# Patient Record
Sex: FEMALE | Race: WHITE | NOT HISPANIC OR LATINO | Employment: OTHER | URBAN - METROPOLITAN AREA
[De-identification: names, ages, dates, MRNs, and addresses within clinical notes are randomized per-mention and may not be internally consistent; named-entity substitution may affect disease eponyms.]

---

## 2021-11-03 ENCOUNTER — APPOINTMENT (OUTPATIENT)
Dept: CT IMAGING | Facility: CLINIC | Age: 56
DRG: 299 | End: 2021-11-03
Attending: EMERGENCY MEDICINE
Payer: COMMERCIAL

## 2021-11-03 ENCOUNTER — APPOINTMENT (OUTPATIENT)
Dept: ULTRASOUND IMAGING | Facility: CLINIC | Age: 56
DRG: 299 | End: 2021-11-03
Attending: EMERGENCY MEDICINE
Payer: COMMERCIAL

## 2021-11-03 ENCOUNTER — HOSPITAL ENCOUNTER (INPATIENT)
Facility: CLINIC | Age: 56
LOS: 2 days | Discharge: HOME | DRG: 299 | End: 2021-11-05
Attending: EMERGENCY MEDICINE | Admitting: INTERNAL MEDICINE
Payer: COMMERCIAL

## 2021-11-03 DIAGNOSIS — I26.09 OTHER ACUTE PULMONARY EMBOLISM WITH ACUTE COR PULMONALE (H): ICD-10-CM

## 2021-11-03 DIAGNOSIS — R91.8 PULMONARY NODULES: ICD-10-CM

## 2021-11-03 DIAGNOSIS — Z20.822 SUSPECTED 2019 NOVEL CORONAVIRUS INFECTION: ICD-10-CM

## 2021-11-03 DIAGNOSIS — M79.662 PAIN OF LEFT CALF: ICD-10-CM

## 2021-11-03 DIAGNOSIS — I82.4Y2 ACUTE DEEP VEIN THROMBOSIS (DVT) OF PROXIMAL VEIN OF LEFT LOWER EXTREMITY (H): ICD-10-CM

## 2021-11-03 DIAGNOSIS — R07.9 ACUTE CHEST PAIN: ICD-10-CM

## 2021-11-03 LAB
ANION GAP SERPL CALCULATED.3IONS-SCNC: 9 MMOL/L (ref 3–14)
BASOPHILS # BLD AUTO: 0 10E3/UL (ref 0–0.2)
BASOPHILS NFR BLD AUTO: 0 %
BUN SERPL-MCNC: 14 MG/DL (ref 7–30)
CALCIUM SERPL-MCNC: 8.2 MG/DL (ref 8.5–10.1)
CHLORIDE BLD-SCNC: 104 MMOL/L (ref 94–109)
CO2 SERPL-SCNC: 24 MMOL/L (ref 20–32)
CREAT SERPL-MCNC: 1 MG/DL (ref 0.52–1.04)
D DIMER PPP FEU-MCNC: >20 UG/ML FEU (ref 0–0.5)
EOSINOPHIL # BLD AUTO: 0.1 10E3/UL (ref 0–0.7)
EOSINOPHIL NFR BLD AUTO: 1 %
ERYTHROCYTE [DISTWIDTH] IN BLOOD BY AUTOMATED COUNT: 11.9 % (ref 10–15)
GFR SERPL CREATININE-BSD FRML MDRD: 64 ML/MIN/1.73M2
GLUCOSE BLD-MCNC: 111 MG/DL (ref 70–99)
HCT VFR BLD AUTO: 35.9 % (ref 35–47)
HGB BLD-MCNC: 12.5 G/DL (ref 11.7–15.7)
IMM GRANULOCYTES # BLD: 0 10E3/UL
IMM GRANULOCYTES NFR BLD: 0 %
LYMPHOCYTES # BLD AUTO: 1 10E3/UL (ref 0.8–5.3)
LYMPHOCYTES NFR BLD AUTO: 13 %
MCH RBC QN AUTO: 32.1 PG (ref 26.5–33)
MCHC RBC AUTO-ENTMCNC: 34.8 G/DL (ref 31.5–36.5)
MCV RBC AUTO: 92 FL (ref 78–100)
MONOCYTES # BLD AUTO: 0.6 10E3/UL (ref 0–1.3)
MONOCYTES NFR BLD AUTO: 8 %
NEUTROPHILS # BLD AUTO: 5.7 10E3/UL (ref 1.6–8.3)
NEUTROPHILS NFR BLD AUTO: 78 %
NRBC # BLD AUTO: 0 10E3/UL
NRBC BLD AUTO-RTO: 0 /100
NT-PROBNP SERPL-MCNC: 162 PG/ML (ref 0–900)
PLATELET # BLD AUTO: 141 10E3/UL (ref 150–450)
POTASSIUM BLD-SCNC: 3.7 MMOL/L (ref 3.4–5.3)
RBC # BLD AUTO: 3.9 10E6/UL (ref 3.8–5.2)
SODIUM SERPL-SCNC: 137 MMOL/L (ref 133–144)
TROPONIN I SERPL-MCNC: <0.015 UG/L (ref 0–0.04)
WBC # BLD AUTO: 7.3 10E3/UL (ref 4–11)

## 2021-11-03 PROCEDURE — 250N000009 HC RX 250: Performed by: EMERGENCY MEDICINE

## 2021-11-03 PROCEDURE — 36415 COLL VENOUS BLD VENIPUNCTURE: CPT | Performed by: EMERGENCY MEDICINE

## 2021-11-03 PROCEDURE — 99285 EMERGENCY DEPT VISIT HI MDM: CPT | Mod: 25

## 2021-11-03 PROCEDURE — 83880 ASSAY OF NATRIURETIC PEPTIDE: CPT | Performed by: EMERGENCY MEDICINE

## 2021-11-03 PROCEDURE — 250N000011 HC RX IP 250 OP 636: Performed by: EMERGENCY MEDICINE

## 2021-11-03 PROCEDURE — 85379 FIBRIN DEGRADATION QUANT: CPT | Performed by: EMERGENCY MEDICINE

## 2021-11-03 PROCEDURE — 93971 EXTREMITY STUDY: CPT | Mod: LT

## 2021-11-03 PROCEDURE — 71275 CT ANGIOGRAPHY CHEST: CPT

## 2021-11-03 PROCEDURE — 85004 AUTOMATED DIFF WBC COUNT: CPT | Performed by: EMERGENCY MEDICINE

## 2021-11-03 PROCEDURE — 80048 BASIC METABOLIC PNL TOTAL CA: CPT | Performed by: EMERGENCY MEDICINE

## 2021-11-03 PROCEDURE — 84484 ASSAY OF TROPONIN QUANT: CPT | Performed by: EMERGENCY MEDICINE

## 2021-11-03 PROCEDURE — 120N000001 HC R&B MED SURG/OB

## 2021-11-03 PROCEDURE — 93005 ELECTROCARDIOGRAM TRACING: CPT

## 2021-11-03 RX ORDER — HEPARIN SODIUM 10000 [USP'U]/100ML
0-5000 INJECTION, SOLUTION INTRAVENOUS CONTINUOUS
Status: DISCONTINUED | OUTPATIENT
Start: 2021-11-03 | End: 2021-11-04

## 2021-11-03 RX ORDER — IOPAMIDOL 755 MG/ML
500 INJECTION, SOLUTION INTRAVASCULAR ONCE
Status: COMPLETED | OUTPATIENT
Start: 2021-11-03 | End: 2021-11-03

## 2021-11-03 RX ADMIN — HEPARIN SODIUM 1800 UNITS/HR: 10000 INJECTION, SOLUTION INTRAVENOUS at 23:21

## 2021-11-03 RX ADMIN — IOPAMIDOL 90 ML: 755 INJECTION, SOLUTION INTRAVENOUS at 21:44

## 2021-11-03 RX ADMIN — SODIUM CHLORIDE 100 ML: 9 INJECTION, SOLUTION INTRAVENOUS at 21:51

## 2021-11-03 ASSESSMENT — ENCOUNTER SYMPTOMS: SHORTNESS OF BREATH: 1

## 2021-11-03 NOTE — ED NOTES
Bed: HW03  Expected date: 11/3/21  Expected time: 6:38 PM  Means of arrival: Ambulance  Comments:  BV3-calf pain

## 2021-11-03 NOTE — ED TRIAGE NOTES
Pt arrived by EMS for concern of PE/ DVT; pt states she is having heaviness in her chest today; pt has swelling and tenderness to left calf; pt advised to come to ED by pcp

## 2021-11-04 ENCOUNTER — APPOINTMENT (OUTPATIENT)
Dept: CARDIOLOGY | Facility: CLINIC | Age: 56
DRG: 299 | End: 2021-11-04
Attending: INTERNAL MEDICINE
Payer: COMMERCIAL

## 2021-11-04 LAB
ANION GAP SERPL CALCULATED.3IONS-SCNC: 6 MMOL/L (ref 3–14)
ATRIAL RATE - MUSE: 69 BPM
BUN SERPL-MCNC: 14 MG/DL (ref 7–30)
CALCIUM SERPL-MCNC: 8.1 MG/DL (ref 8.5–10.1)
CHLORIDE BLD-SCNC: 107 MMOL/L (ref 94–109)
CO2 SERPL-SCNC: 26 MMOL/L (ref 20–32)
CREAT SERPL-MCNC: 0.8 MG/DL (ref 0.52–1.04)
DIASTOLIC BLOOD PRESSURE - MUSE: NORMAL MMHG
ERYTHROCYTE [DISTWIDTH] IN BLOOD BY AUTOMATED COUNT: 11.9 % (ref 10–15)
ERYTHROCYTE [DISTWIDTH] IN BLOOD BY AUTOMATED COUNT: 12.1 % (ref 10–15)
GFR SERPL CREATININE-BSD FRML MDRD: 84 ML/MIN/1.73M2
GLUCOSE BLD-MCNC: 101 MG/DL (ref 70–99)
HCT VFR BLD AUTO: 36.4 % (ref 35–47)
HCT VFR BLD AUTO: 37.1 % (ref 35–47)
HGB BLD-MCNC: 12.5 G/DL (ref 11.7–15.7)
HGB BLD-MCNC: 12.5 G/DL (ref 11.7–15.7)
INTERPRETATION ECG - MUSE: NORMAL
LVEF ECHO: NORMAL
MCH RBC QN AUTO: 31.3 PG (ref 26.5–33)
MCH RBC QN AUTO: 31.6 PG (ref 26.5–33)
MCHC RBC AUTO-ENTMCNC: 33.7 G/DL (ref 31.5–36.5)
MCHC RBC AUTO-ENTMCNC: 34.3 G/DL (ref 31.5–36.5)
MCV RBC AUTO: 92 FL (ref 78–100)
MCV RBC AUTO: 93 FL (ref 78–100)
P AXIS - MUSE: 55 DEGREES
PLATELET # BLD AUTO: 147 10E3/UL (ref 150–450)
PLATELET # BLD AUTO: 148 10E3/UL (ref 150–450)
POTASSIUM BLD-SCNC: 3.9 MMOL/L (ref 3.4–5.3)
PR INTERVAL - MUSE: 190 MS
QRS DURATION - MUSE: 92 MS
QT - MUSE: 414 MS
QTC - MUSE: 443 MS
R AXIS - MUSE: 12 DEGREES
RAD FLAG-ADDENDUM: ABNORMAL
RBC # BLD AUTO: 3.95 10E6/UL (ref 3.8–5.2)
RBC # BLD AUTO: 4 10E6/UL (ref 3.8–5.2)
SODIUM SERPL-SCNC: 139 MMOL/L (ref 133–144)
SYSTOLIC BLOOD PRESSURE - MUSE: NORMAL MMHG
T AXIS - MUSE: 52 DEGREES
TROPONIN I SERPL-MCNC: <0.015 UG/L (ref 0–0.04)
UFH PPP CHRO-ACNC: 0.35 IU/ML
UFH PPP CHRO-ACNC: 0.41 IU/ML
UFH PPP CHRO-ACNC: 0.62 IU/ML
VENTRICULAR RATE- MUSE: 69 BPM
WBC # BLD AUTO: 6.8 10E3/UL (ref 4–11)
WBC # BLD AUTO: 8.6 10E3/UL (ref 4–11)

## 2021-11-04 PROCEDURE — 85520 HEPARIN ASSAY: CPT | Performed by: INTERNAL MEDICINE

## 2021-11-04 PROCEDURE — 36415 COLL VENOUS BLD VENIPUNCTURE: CPT | Performed by: INTERNAL MEDICINE

## 2021-11-04 PROCEDURE — 84484 ASSAY OF TROPONIN QUANT: CPT | Performed by: INTERNAL MEDICINE

## 2021-11-04 PROCEDURE — 120N000001 HC R&B MED SURG/OB

## 2021-11-04 PROCEDURE — 99223 1ST HOSP IP/OBS HIGH 75: CPT | Mod: AI | Performed by: INTERNAL MEDICINE

## 2021-11-04 PROCEDURE — 80048 BASIC METABOLIC PNL TOTAL CA: CPT | Performed by: INTERNAL MEDICINE

## 2021-11-04 PROCEDURE — 250N000011 HC RX IP 250 OP 636: Performed by: INTERNAL MEDICINE

## 2021-11-04 PROCEDURE — 85027 COMPLETE CBC AUTOMATED: CPT | Performed by: INTERNAL MEDICINE

## 2021-11-04 PROCEDURE — 93306 TTE W/DOPPLER COMPLETE: CPT | Mod: 26 | Performed by: INTERNAL MEDICINE

## 2021-11-04 PROCEDURE — 93306 TTE W/DOPPLER COMPLETE: CPT

## 2021-11-04 PROCEDURE — 258N000003 HC RX IP 258 OP 636: Performed by: INTERNAL MEDICINE

## 2021-11-04 RX ORDER — ACETAMINOPHEN 325 MG/1
650 TABLET ORAL EVERY 6 HOURS PRN
Status: DISCONTINUED | OUTPATIENT
Start: 2021-11-04 | End: 2021-11-05 | Stop reason: HOSPADM

## 2021-11-04 RX ORDER — ACETAMINOPHEN 650 MG/1
650 SUPPOSITORY RECTAL EVERY 6 HOURS PRN
Status: DISCONTINUED | OUTPATIENT
Start: 2021-11-04 | End: 2021-11-05 | Stop reason: HOSPADM

## 2021-11-04 RX ORDER — LIDOCAINE 40 MG/G
CREAM TOPICAL
Status: DISCONTINUED | OUTPATIENT
Start: 2021-11-04 | End: 2021-11-05 | Stop reason: HOSPADM

## 2021-11-04 RX ORDER — ONDANSETRON 4 MG/1
4 TABLET, ORALLY DISINTEGRATING ORAL EVERY 6 HOURS PRN
Status: DISCONTINUED | OUTPATIENT
Start: 2021-11-04 | End: 2021-11-05 | Stop reason: HOSPADM

## 2021-11-04 RX ORDER — ONDANSETRON 2 MG/ML
4 INJECTION INTRAMUSCULAR; INTRAVENOUS EVERY 6 HOURS PRN
Status: DISCONTINUED | OUTPATIENT
Start: 2021-11-04 | End: 2021-11-05 | Stop reason: HOSPADM

## 2021-11-04 RX ORDER — ESTRADIOL VALERATE 20 MG/ML
0.2 INJECTION INTRAMUSCULAR WEEKLY
Status: ON HOLD | COMMUNITY
Start: 2021-03-10 | End: 2021-11-05

## 2021-11-04 RX ORDER — AMOXICILLIN 250 MG
1 CAPSULE ORAL 2 TIMES DAILY PRN
Status: DISCONTINUED | OUTPATIENT
Start: 2021-11-04 | End: 2021-11-05 | Stop reason: HOSPADM

## 2021-11-04 RX ORDER — AMOXICILLIN 250 MG
2 CAPSULE ORAL 2 TIMES DAILY PRN
Status: DISCONTINUED | OUTPATIENT
Start: 2021-11-04 | End: 2021-11-05 | Stop reason: HOSPADM

## 2021-11-04 RX ORDER — SODIUM CHLORIDE 9 MG/ML
INJECTION, SOLUTION INTRAVENOUS CONTINUOUS
Status: ACTIVE | OUTPATIENT
Start: 2021-11-04 | End: 2021-11-04

## 2021-11-04 RX ORDER — HEPARIN SODIUM 10000 [USP'U]/100ML
0-5000 INJECTION, SOLUTION INTRAVENOUS CONTINUOUS
Status: DISCONTINUED | OUTPATIENT
Start: 2021-11-04 | End: 2021-11-05

## 2021-11-04 RX ADMIN — SODIUM CHLORIDE: 9 INJECTION, SOLUTION INTRAVENOUS at 02:53

## 2021-11-04 RX ADMIN — HEPARIN SODIUM 1800 UNITS/HR: 10000 INJECTION, SOLUTION INTRAVENOUS at 01:48

## 2021-11-04 RX ADMIN — HEPARIN SODIUM 1800 UNITS/HR: 10000 INJECTION, SOLUTION INTRAVENOUS at 08:56

## 2021-11-04 RX ADMIN — SODIUM CHLORIDE: 9 INJECTION, SOLUTION INTRAVENOUS at 07:47

## 2021-11-04 RX ADMIN — HEPARIN SODIUM 1800 UNITS/HR: 10000 INJECTION, SOLUTION INTRAVENOUS at 23:00

## 2021-11-04 ASSESSMENT — ACTIVITIES OF DAILY LIVING (ADL)
ADLS_ACUITY_SCORE: 3
ADLS_ACUITY_SCORE: 12
ADLS_ACUITY_SCORE: 3
ADLS_ACUITY_SCORE: 12
ADLS_ACUITY_SCORE: 3
ADLS_ACUITY_SCORE: 12
ADLS_ACUITY_SCORE: 3

## 2021-11-04 NOTE — CONSULTS
Anticoagulation coverage check.  Patient has [employer] [facility].    Xarelto/Eliquis:  $3/mo.     Enoxaparin 100mg x 10 syringes: $1.    Sedricktoven (warfarin): $1/mo.      TRENT Ruiz, Pharmacy Technician/Liaison, Discharge Pharmacy, [phone]

## 2021-11-04 NOTE — PLAN OF CARE
girma PO well, up indep in room,denies pain, voiding in good amts, Heparin IV at 1800 units per hour    1900 pt called nurse into room stating felt dizzy, flushed and like she was having a mild anxiety attack. She stated she had 2 episodes like this yesterday. Vitals were taken and were WDL. Report given to evening nurse who will continue to monitor

## 2021-11-04 NOTE — ED PROVIDER NOTES
History   Chief Complaint:  Chest Pain and calf pain       The history is provided by the patient.      Jerri Peraza is a 56-year-old female presenting to the ED from urgent care for evaluation of left calf pain and chest pain.  Patient reports approximately 2 weeks ago, she felt as though she was coming down with a sinus infection given associated congestion.  She thereafter had decreased sense of smell.  This was reminiscent of her diagnosis of Covid-19 last year.  She has since been vaccinated earlier this year.  Beginning on Sunday, she notes the feeling of congestion seemed to descend down into her chest, with associated heaviness.  She notes the heaviness initially in the morning, though this improves as the day goes on.  She denies saúl pain.  She does report more shortness of breath, particularly with exertion.  Over the past 2 weeks, she also has noted a pain to her left calf.  She was seen at urgent care and given concern for DVT/PE was referred to the ER for further evaluation.  She reports a prior history of superficial thrombophlebitis, though no prior diagnosis of DVT nor PE.  She denies any history of known coronary artery disease, nor diagnosis of hypertension, hyperlipidemia nor diabetes.  She is a non-smoker.  There is a family history of peripheral arterial disease in father, though no other genetic prothrombotic disorders patient is aware of.       Review of Systems   HENT: Positive for congestion.    Respiratory: Positive for shortness of breath.    Cardiovascular: Positive for chest pain.        Calf pain    All other systems reviewed and are negative.      Allergies:  The patient has no known allergies.     Medications:  No current outpatient medications on file.    Past Medical History:    No prior PE or DVT  COVID-19    Social History:  Patient was accompanied to the ED.  No tobacco use    Physical Exam     Patient Vitals for the past 24 hrs:   BP Temp Temp src Pulse Resp SpO2 Weight    11/03/21 2000 99/87 -- -- 65 -- 96 % --   11/03/21 1852 (!) 144/96 98.5  F (36.9  C) Oral 73 13 95 % 99.8 kg (220 lb)       Physical Exam  General:              Well-nourished              Speaking in full sentences  Eyes:              Conjunctiva without injection or scleral icterus  ENT:              Moist mucous membranes              Nares patent              Pinnae normal  Neck:              Full ROM              No stiffness appreciated  Resp:              Lungs CTAB              No crackles, wheezing or audible rubs              Good air movement  CV:                    Normal rate, regular rhythm              S1 and S2 present              No murmur, gallop or rub  GI:              BS present              Abdomen soft without distention              Non-tender to light and deep palpation              No guarding or rebound tenderness  Skin:              Warm, dry, well perfused              No rashes or open wounds on exposed skin  MSK:              Moves all extremities              No focal deformities or swelling  Neuro:              Alert              Answers questions appropriately              Moves all extremities equally              Gait stable  Psych:              Normal affect, normal mood      Emergency Department Course     ECG:  ECG taken at 1855, ECG read at 1859  Normal sinus rhythm. Normal ECG.    Rate 69 bpm. ND interval 190 ms. QRS duration 92 ms. QT/QTc 414/443 ms. P-R-T axes 55 12 52.     Imaging:  CT Chest Pulmonary Embolism w Contrast  1.  Examination positive for bilateral pulmonary emboli with proximal extent noted in the right upper and lower lobar pulmonary arteries. There is CT evidence of mild right heart strain.  2.  A few nodular peripheral airspace opacities measuring up to 9 mm in the right lower lobe may be inflammatory, though recommend short-term follow-up chest CT to ensure resolution.  3.  Findings discussed by telephone with Dr. Longoria at 2206 on 11/03/2021.  Reading  per radiology.     US Lower Extremity Venous Duplex Left  Nonocclusive deep vein thrombosis in the distal femoral vein and posterior tibial and peroneal veins of the calf.  Reading per radiology.     Laboratory:  CBC: WBC: 7.3, HGB: 12.5, PLT: 141 (L)     BMP: Glucose 111 H , Calcium: 8.2 (L) , o/w WNL (Creatinine: 1.0)    Troponin (Collected 1918): <0.015    D-dimer: >20.0 (HH)    BNP: 162    Emergency Department Course:    Reviewed:  I reviewed nursing notes, vitals and past medical history    Assessments:  1901 I obtained history and examined the patient as noted above.   2115 I rechecked the patient and explained her blood work. We had a long conversation with the risks and benefits of having CT imaging vs. no CT imaging.     Consults:   2208 I consulted with Dr. Vergara, radiology, regarding the patient's CT chest imaging.   2319 I consulted with Dr. Villalobos, [facility], regarding the patient's history and presentation in the ED.     Disposition:  The patient was admitted to the hospital under the care of Dr. Villalobos.       Impression & Plan     CMS Diagnoses: None    Medical Decision Making:  Jerri Peraza is a 56-year-old female presenting to the emergency department for evaluation of left calf pain and chest pain.  VS on presentation reveal elevated BP though otherwise are unremarkable.  History, exam, and ED course as outlined above. Initial work-up included D-dimer which returned markedly elevated greater than 20.  Venous ultrasound confirms a nonocclusive DVT in the distal femoral vein, posterior tibial and peroneal veins of the calf.  Given associated chest discomfort, high suspicion for associated pulmonary embolism.  We discussed patient's current hemodynamic stability, negative troponin, and unremarkable EKG.  Using shared decision-making, we discussed risks/benefits of further evaluation with CT.  Patient is electing to proceed which I do feel to be reasonable.  CT chest does demonstrate  positive bilateral pulmonary emboli noted to the right upper and right lower lobe pulmonary arteries as well as segmental and subsegmental pulmonary arteries involving all lobes of both lungs.  Fortunately no evidence of saddle or central pulmonary embolism or aortic dissection.  Patient is noted to have evidence of RV strain however.  Troponin and BNP are within normal limits.  Patient was informed of the incidentally noted pulmonary nodules, which will require further evaluation as an outpatient.  In the absence of contraindications, patient was started on heparin here in the ER will plan for hospital admission given suggestion of right heart strain on CT.  Questions answered prior to admission.       Diagnosis:    ICD-10-CM    1. Acute chest pain  R07.9    2. Pain of left calf  M79.662    3. Suspected 2019 novel coronavirus infection  Z20.822    4. Other acute pulmonary embolism with acute cor pulmonale (H)  I26.09    5. Acute deep vein thrombosis (DVT) of proximal vein of left lower extremity (H)  I82.4Y2    6. Pulmonary nodules  R91.8        Scribe Disclosure:  I, Susan Smalls, am serving as a scribe at 7:03 PM on 11/3/2021 to document services personally performed by Avery Longoria MD based on my observations and the provider's statements to me.          Avery Longoria MD  11/03/21 0164

## 2021-11-04 NOTE — ED NOTES
[facility]  ED Nurse Handoff Report    Jerri Peraza is a 56 year old female   ED Chief complaint: Chest Pain and calf pain  . ED Diagnosis:   Final diagnoses:   Acute chest pain   Pain of left calf   Suspected 2019 novel coronavirus infection   Other acute pulmonary embolism with acute cor pulmonale (H)   Acute deep vein thrombosis (DVT) of proximal vein of left lower extremity (H)   Pulmonary nodules     Allergies: No Known Allergies    Code Status: Full Code  Activity level - Baseline/Home:  Independent. Activity Level - Current:   Stand by Assist. Lift room needed: No. Bariatric: No   Needed: No   Isolation: No. Infection: Not Applicable.     Vital Signs:   Vitals:    11/03/21 1852 11/03/21 2000   BP: (!) 144/96 99/87   Pulse: 73 65   Resp: 13    Temp: 98.5  F (36.9  C)    TempSrc: Oral    SpO2: 95% 96%   Weight: 99.8 kg (220 lb)        Cardiac Rhythm:  ,      Pain level:    Patient confused: No. Patient Falls Risk: Yes.   Elimination Status: Has voided   Patient Report - Initial Complaint: Chest pain. Focused Assessment: t arrived by EMS for concern of PE/ DVT; pt states she is having heaviness in her chest today; pt has swelling and tenderness to left calf; pt advised to come to ED by pcp   Tests Performed: labs, imaging. Abnormal Results:   Labs Ordered and Resulted from Time of ED Arrival to Time of ED Departure   D DIMER QUANTITATIVE - Abnormal       Result Value    D-Dimer Quantitative >20.00 (*)    BASIC METABOLIC PANEL - Abnormal    Sodium 137      Potassium 3.7      Chloride 104      Carbon Dioxide (CO2) 24      Anion Gap 9      Urea Nitrogen 14      Creatinine 1.00      Calcium 8.2 (*)     Glucose 111 (*)     GFR Estimate 64     CBC WITH PLATELETS AND DIFFERENTIAL - Abnormal    WBC Count 7.3      RBC Count 3.90      Hemoglobin 12.5      Hematocrit 35.9      MCV 92      MCH 32.1      MCHC 34.8      RDW 11.9      Platelet Count 141 (*)     % Neutrophils 78      %  Lymphocytes 13      % Monocytes 8      % Eosinophils 1      % Basophils 0      % Immature Granulocytes 0      NRBCs per 100 WBC 0      Absolute Neutrophils 5.7      Absolute Lymphocytes 1.0      Absolute Monocytes 0.6      Absolute Eosinophils 0.1      Absolute Basophils 0.0      Absolute Immature Granulocytes 0.0      Absolute NRBCs 0.0     TROPONIN I - Normal    Troponin I <0.015     NT PROBNP INPATIENT - Normal    N terminal Pro BNP Inpatient 162       CT Chest Pulmonary Embolism w Contrast   Final Result   IMPRESSION:   1.  Examination positive for bilateral pulmonary emboli with proximal extent noted in the right upper and lower lobar pulmonary arteries. There is CT evidence of mild right heart strain.      2.  A few nodular peripheral airspace opacities measuring up to 9 mm in the right lower lobe may be inflammatory, though recommend short-term follow-up chest CT to ensure resolution.      3.  Findings discussed by telephone with Dr. Longoria at 2208 on 11/03/2021.      US Lower Extremity Venous Duplex Left   Final Result   IMPRESSION:   1.  Nonocclusive deep vein thrombosis in the distal femoral vein and posterior tibial and peroneal veins of the calf.        .   Treatments provided: heparin drip  Family Comments: friend present  OBS brochure/video discussed/provided to patient:  No  ED Medications:   Medications   heparin 25,000 units in 0.45% NaCl 250 mL ANTICOAGULANT infusion (1,800 Units/hr Intravenous New Bag 11/3/21 2321)   Saline CT scan flush (100 mLs Intravenous Given 11/3/21 2151)   iopamidol (ISOVUE-370) solution 500 mL (90 mLs Intravenous Given 11/3/21 2144)   heparin ANTICOAGULANT Loading dose for HIGH INTENSITY TREATMENT * Give BEFORE starting heparin infusion (8,000 Units Intravenous Given 11/3/21 2320)     Drips infusing:  No  For the majority of the shift, the patient's behavior Green. Interventions performed were N/A.    Sepsis treatment initiated: No     Patient tested for COVID 19 prior to  admission: YES    ED Nurse Name/Phone Number: Merly Payne RN,   11:51 PM    RECEIVING UNIT ED HANDOFF REVIEW    Above ED Nurse Handoff Report was reviewed: Yes  Reviewed by: Cele Mccarthy, RUBI on November 4, 2021 at 11:40 AM   Did you vocera the ED RN: yes, Mey Marlow RN

## 2021-11-04 NOTE — H&P
H&P dictated    Admit for PE    Incidental finding of 9 mm nodule on chest CT with recommendation for short term follow up.  I did NOT discuss this with the patient and should be discussed with her prior to discharge.

## 2021-11-04 NOTE — PROGRESS NOTES
I agree with the note and plan per Dr Villalobos. Admitted with acute PE and concomitant LLE DVT. Continue IV heparin and switch to DOAC in am.

## 2021-11-04 NOTE — H&P
Admitted: 11/03/2021    CHIEF COMPLAINT:  Left calf pain.  The patient found to have DVT and PE.    HISTORY OF PRESENT ILLNESS:  Ms. Peraza is a 56-year-old female with no significant medical history who presented to the hospital for the above concerns.  The patient reports that she began to notice symptoms approximately 2 weeks ago.  At that point, she felt like she was coming down with a sinus infection.  She also thought that perhaps she had COVID again.  She had COVID previously.  She noticed a change in her smell that felt similar to previous COVID symptoms.  She also noticed some chest congestion and associated shortness of breath symptoms.  Over the past 2 weeks, she also noticed pain in her left calf.  She does have a prior history of superficial thrombophlebitis but no history of deep venous thrombosis or pulmonary embolism.  She went to urgent care and was referred to the ER for evaluation.    On arrival to the ER, vital signs included blood pressure 145/95 with a heart rate of 75.  No fever.  Saturation 95% on room air.  Workup in the ER included labs and imaging.  CBC normal.  BMP normal.  Troponin undetectable.  D-dimer greater than 20.  BNP was 160.  Further workup included a chest CT scan which was positive for bilateral pulmonary emboli with proximal extent noted in the right upper and lower lobar pulmonary arteries.  There was CT evidence of mild right heart strain.  Left lower extremity duplex ultrasound also showed nonocclusive DVT in the distal femoral vein and posterior tibial and peroneal veins of the calf.  Incidental finding of a 9 mm right lower lobe nodule with recommendation for short-term followup CT as well.    I spoke with Dr. Longoria of the ER.  The patient is being admitted on a heparin drip to the Hospitalist Service.    PAST MEDICAL HISTORY:    1.  History of COVID-19 infection.  She has since been vaccinated.  2.  History of superficial thrombophlebitis but no history of deep  venous thrombosis or PE previously.    CURRENT MEDICATIONS:  None.    FAMILY HISTORY:  Reviewed.  No known history of DVT or PE.    ALLERGIES:  NONE.    SOCIAL HISTORY:  Denies smoking.  Social alcohol use.    REVIEW OF SYSTEMS:  See HPI for details.  Comprehensive greater than 10-point review of systems is otherwise negative besides that detailed above.    PHYSICAL EXAMINATION:    VITAL SIGNS:  Blood pressure is 150/95 with a heart rate of 65.  No fever.  Saturation 97% on room air.  GENERAL:  The patient appears nontoxic and in no acute distress.  She appears awake, alert, oriented x 3.  She is a good historian.  HEENT:  Head is atraumatic.  Sclerae white.  Eyelids normal.  Conjunctivae normal.  Extraocular movements are intact.  NECK:  Supple.  No cervical or supraclavicular lymphadenopathy.  CARDIOVASCULAR:  Heart is regular rate and rhythm.  No significant murmurs.  No lower extremity edema.  LUNGS:  Clear to auscultation bilaterally.  No intercostal retractions.  No conversational dyspnea.  ABDOMEN:  Nontender, soft, no masses, no organomegaly.  EXTREMITIES:  Show no edema.  SKIN:  Reveals no rashes.  No jaundice.  Skin is dry to touch.  NEUROLOGIC:  Cranial nerves II-XII are intact.  Moves all extremities appropriately.  Sensation intact to light touch in the upper and lower extremities bilaterally.  PSYCHIATRIC:  The patient is awake, alert and oriented x 3.  Mood and affect are normal and appropriate.    ASSESSMENT AND IMAGING DATA:  Reviewed above in HPI.    IMPRESSION AND PLAN:  Ms. Peraza is a 56-year-old female with a distant history of COVID infection, who presented to the hospital this evening for concerns about left leg pain and shortness of breath.  The patient was found to have acute left lower extremity deep venous thrombosis and acute pulmonary embolism.  There was question of possible right heart strain on CT imaging.  1.  Acute pulmonary embolism with questionable right heart strain on CT  imaging.  Reassuring that troponin enzyme and BNP are both normal and no acute findings on EKG.  2.  Acute left lower extremity deep venous thrombosis.  3.  Distant history of COVID infection.  She has since been vaccinated.  She did have a COVID test performed on 2021 in the setting of her symptoms.  It was negative.    PLAN:    1.  Admit to Hospitalist Service.  2.  Continue heparin drip started in the Emergency Department.  3.  I discussed with her use of warfarin versus a DOAC for treatment.  She prefers the newer DOAC medication.  We will have Pharmacy liaison review insurance for price.  4.  Echocardiogram tomorrow for completeness in the setting of questionable right heart strain on CT imaging.  Given a normal troponin, doubt clinically significant heart strain will be found an echocardiogram.  5.  Telemetry monitoring overnight.  6.  Repeat troponin in the morning along with a basic metabolic panel and CBC.  7.  I discussed with the patient about performing hypercoagulable workup after discharge from the hospital, as there is no clear etiology for her acute thromboembolic event.  8.  Incidental finding of a 9 mm pulmonary nodule on chest CT imaging, with recommendation for short-term CT followup.  I did not discuss this with the patient this evening.    Jules Villalobos MD        D: 2021   T: 2021   MT: JOE    Name:     ALEXIA CORBIN  MRN:      [id number]        Account:     [id number]   :      1965           Admitted:    2021       Document: [id number]

## 2021-11-04 NOTE — PHARMACY-ADMISSION MEDICATION HISTORY
Admission medication history interview status for this patient is complete. See [facility] admission navigator for allergy information, prior to admission medications and immunization status.     Medication history interview done, indicate source(s): Patient  Medication history resources (including written lists, pill bottles, clinic record):None  Pharmacy: [id number] [place], [place] - [address] [facility] [address]    Changes made to PTA medication list:  Added: estradiol injection  Changed: -  Reported as Not Taking: -  Removed: -    Actions taken by pharmacist (provider contacted, etc):None   Additional medication history information:None  Medication reconciliation/reorder completed by provider prior to medication history?  no     Prior to Admission medications    Medication Sig Last Dose Taking? Auth Provider   estradiol valerate (DELESTROGEN) 20 MG/ML injection Inject 0.2 mLs into the muscle once a week 10/29/2021 Yes Unknown, Entered By History

## 2021-11-04 NOTE — UTILIZATION REVIEW
Admission Status; Secondary Review Determination    Under the authority of the Utilization Management Committee, the utilization review process indicated a secondary review on the above patient. The review outcome is based on review of the medical records, discussions with staff, and applying clinical experience noted on the date of the review.    (x) Inpatient Status Appropriate - This patient's medical care is consistent with medical management for inpatient care and reasonable inpatient medical practice.    RATIONALE FOR DETERMINATION: 56-year-old female with history of prior COVID-19 infection, since vaccinated who developed some URI symptoms 2 weeks ago followed by a change in smell similar to previous Covid symptoms along with chest congestion and shortness of breath.  She then has developed some discomfort in her left calf and was referred to the emergency room for evaluation.  Patient found to have bilateral pulmonary emboli with proximal extent noted in the right upper and lower lobar pulmonary arteries with evidence of right heart strain.  Due to the severity of patient's findings of acute pulmonary embolus with strain, inpatient management with initial heparinization prior to switching to a NOAC and outpatient follow-up is appropriate.    At the time of admission with the information available to the attending physician more than 2 nights Hospital complex care was anticipated, based on patient risk of adverse outcome if treated as outpatient and complex care required. Inpatient admission is appropriate based on the Medicare guidelines.    This document was produced using voice recognition software    The information on this document is developed by the utilization review team in order for the business office to ensure compliance. This only denotes the appropriateness of proper admission status and does not reflect the quality of care rendered.    The definitions of Inpatient Status and Observation  Status used in making the determination above are those provided in the CMS Coverage Manual, Chapter 1 and Chapter 6, section 70.4.    Sincerely,    Van Camargo MD  Utilization Review  Physician Advisor  [facility].

## 2021-11-05 ENCOUNTER — APPOINTMENT (OUTPATIENT)
Dept: CARDIOLOGY | Facility: CLINIC | Age: 56
DRG: 299 | End: 2021-11-05
Attending: INTERNAL MEDICINE
Payer: COMMERCIAL

## 2021-11-05 VITALS
TEMPERATURE: 96.6 F | BODY MASS INDEX: 29.8 KG/M2 | WEIGHT: 220 LBS | RESPIRATION RATE: 16 BRPM | OXYGEN SATURATION: 100 % | HEIGHT: 72 IN | HEART RATE: 49 BPM | SYSTOLIC BLOOD PRESSURE: 135 MMHG | DIASTOLIC BLOOD PRESSURE: 74 MMHG

## 2021-11-05 LAB
HOLD SPECIMEN: NORMAL
HOLD SPECIMEN: NORMAL
UFH PPP CHRO-ACNC: 0.49 IU/ML

## 2021-11-05 PROCEDURE — 250N000013 HC RX MED GY IP 250 OP 250 PS 637: Performed by: INTERNAL MEDICINE

## 2021-11-05 PROCEDURE — 93272 ECG/REVIEW INTERPRET ONLY: CPT | Performed by: INTERNAL MEDICINE

## 2021-11-05 PROCEDURE — 36415 COLL VENOUS BLD VENIPUNCTURE: CPT | Performed by: INTERNAL MEDICINE

## 2021-11-05 PROCEDURE — 85520 HEPARIN ASSAY: CPT | Performed by: INTERNAL MEDICINE

## 2021-11-05 PROCEDURE — 93270 REMOTE 30 DAY ECG REV/REPORT: CPT

## 2021-11-05 PROCEDURE — 99239 HOSP IP/OBS DSCHRG MGMT >30: CPT | Performed by: INTERNAL MEDICINE

## 2021-11-05 RX ADMIN — RIVAROXABAN 15 MG: 15 TABLET, FILM COATED ORAL at 12:40

## 2021-11-05 ASSESSMENT — ACTIVITIES OF DAILY LIVING (ADL)
ADLS_ACUITY_SCORE: 3

## 2021-11-05 NOTE — DISCHARGE SUMMARY
[facility]  Hospitalist Discharge Summary      Date of Admission:  11/3/2021  Date of Discharge:  11/5/2021  Discharging Provider: Vickie Chowdhury MD      Discharge Diagnoses       Follow-ups Needed After Discharge   Follow-up Appointments     Follow-up and recommended labs and tests       Follow up with primary care provider, Malena Lizarraga, within 7 days for   hospital follow- up.  The following labs/tests are recommended: CT chest,   follow up event monitor findings and further refills of  xarelto.             Unresulted Labs Ordered in the Past 30 Days of this Admission     No orders found from 10/4/2021 to 11/4/2021.          Discharge Disposition   Discharged to home  Condition at discharge: Stable      Hospital Course       Ms. Peraza is a 56-year-old female with a distant history of COVID infection, who presented to the hospital this evening for concerns about left leg pain and shortness of breath.  The patient was found to have acute left lower extremity deep venous thrombosis and acute pulmonary embolism.  There was question of possible right heart strain on CT imaging.  1.  Acute pulmonary embolism with questionable right heart strain on CT imaging.  Reassuring that troponin enzyme and BNP are both normal and no acute findings on EKG.  2.  Acute left lower extremity deep venous thrombosis.  3.  Distant history of COVID infection.  She has since been vaccinated.  She did have a COVID test performed on 11/02/2021 in the setting of her symptoms.  It was negative.     PLAN:    1.  Admit to Hospitalist Service.  2.  Continue heparin drip started in the Emergency Department.  3.  I discussed with her use of warfarin versus a DOAC for treatment.  She prefers the newer DOAC medication.  We will have Pharmacy liaison review insurance for price.  4.  Echocardiogram tomorrow for completeness in the setting of questionable right heart strain on CT imaging.  Given a normal troponin, doubt  clinically significant heart strain will be found an echocardiogram.  5.  Telemetry monitoring overnight.    Admitted as inpatient and initiated on IV heparin. Her LLE pain has improved and so is her SOB. She is not hypoxic. Discussed lovenox or DOAC. She expresses interest in latter. Will start PO xarelto. Further refills per PCP. Will f/u with lung nodule with PCP. Advised to stop estrogen.        Consultations This Hospital Stay   PHARMACY IP CONSULT  PHARMACY IP CONSULT  PHARMACY IP CONSULT  PHARMACY IP CONSULT  PHARMACY LIAISON FOR MEDICATION COVERAGE CONSULT  PHARMACY DISCHARGE EDUCATION BY PHARMACIST    Code Status   Full Code    Time Spent on this Encounter   I, Vickie Chowdhury MD, personally saw the patient today and spent greater than 30 minutes discharging this patient.       Vickie Chowdhury MD  [facility]  [address] [place] [address]  Phone: [phone]  Fax: [phone]  ______________________________________________________________________    Physical Exam   Vital Signs: Temp: (!) 96.6  F (35.9  C) Temp src: Temporal BP: 121/78 Pulse: (!) 49   Resp: 16 SpO2: 97 % O2 Device: None (Room air)    Weight: 220 lbs 0 oz    Gen - AAO x 3 in NAD.  Lungs - CTA B.  Heart - RR,S1+S2 nml, no m/g/r.  Abd - soft, NT, ND, + BS.  Ext - no edema.       Primary Care Physician   Malena Lizarraga    Discharge Orders      Follow-up and recommended labs and tests     Follow up with primary care provider, Malena Lizarraga, within 7 days for hospital follow- up.  The following labs/tests are recommended: CT chest, follow up event monitor findings and further refills of  xarelto.     Activity    Your activity upon discharge: activity as tolerated     Diet    Follow this diet upon discharge: Orders Placed This Encounter      Combination Diet Regular Diet Adult       Significant Results and Procedures   Results for orders placed or performed during the hospital encounter of 11/03/21    US Lower Extremity Venous Duplex Left     Value    Rad Flag-Addendum Deep venous thrombosis (AA)    Addendum: 11/4/2021      [Critical Result: Deep venous thrombosis]    Finding was identified on 11/03/2021 at 2211 hours.     Dr. Avery Longoria was contacted by QC on 11/03/2021 at 2215 hours and verbalized understanding of the critical result.       Narrative    EXAM: US LOWER EXTREMITY VENOUS DUPLEX LEFT  LOCATION: [facility]  DATE/TIME: 11/3/2021 8:11 PM    INDICATION: calf pain, swelling  COMPARISON: None.  TECHNIQUE: Venous Duplex ultrasound of the left lower extremity with and without compression, augmentation and duplex. Color flow and spectral Doppler with waveform analysis performed.    FINDINGS: Exam includes the common femoral, femoral, popliteal, and contralateral common femoral veins as well as segmentally visualized deep calf veins and greater saphenous vein.     LEFT: Nonocclusive deep vein thrombosis is present within the distal femoral vein extending through the popliteal vein from the posterior tibial and peroneal veins of the calf. No superficial thrombophlebitis. No popliteal cyst.      Impression    IMPRESSION:  1.  Nonocclusive deep vein thrombosis in the distal femoral vein and posterior tibial and peroneal veins of the calf.   CT Chest Pulmonary Embolism w Contrast    Narrative    EXAM: CT CHEST PULMONARY EMBOLISM W CONTRAST  LOCATION: [facility]  DATE/TIME: 11/3/2021 9:39 PM    INDICATION: PE suspected, low/intermediate prob, positive D-dimer  COMPARISON: None.  TECHNIQUE: CT chest pulmonary angiogram during arterial phase injection of IV contrast. Multiplanar reformats and MIP reconstructions were performed. Dose reduction techniques were used.   CONTRAST:  90mL Isovue-370    FINDINGS:  ANGIOGRAM CHEST: Examination is positive for pulmonary emboli within the right upper and right lower lobar pulmonary arteries as well as segmental and subsegmental  pulmonary arteries involving all lobes of both lungs. No central or saddle embolism.   Thoracic aorta is negative for dissection. RV/LV ratio abnormal, greater than 1.0.    LUNGS AND PLEURA: There are a few patchy peripheral airspace infiltrates in the right lower lobe (such as on images 200 and 223 of series 9). These have a somewhat nodular morphology, and the larger of which measures 9 mm on image 200. No pleural   effusion.    MEDIASTINUM/AXILLAE: Heart size is normal. No pericardial effusion. No adenopathy.    CORONARY ARTERY CALCIFICATION: Cannot evaluate secondary to motion.    UPPER ABDOMEN: Mild hepatic steatosis.    MUSCULOSKELETAL: Normal.      Impression    IMPRESSION:  1.  Examination positive for bilateral pulmonary emboli with proximal extent noted in the right upper and lower lobar pulmonary arteries. There is CT evidence of mild right heart strain.    2.  A few nodular peripheral airspace opacities measuring up to 9 mm in the right lower lobe may be inflammatory, though recommend short-term follow-up chest CT to ensure resolution.    3.  Findings discussed by telephone with Dr. Longoria at 2208 on 2021.   Echocardiogram Complete     Value    LVEF  60-65%    Narrative    [id number]  [id number]  [id number]  [id number]^ANI^SARI^AVA     [facility]  Echocardiography Laboratory  [address] [place] [address]     Name: ALEXIA CORBIN  MRN: [id number]  : 1965  Study Date: 2021 08:18 AM  Age: 56 yrs  Gender: Female  Patient Location: [facility]  Reason For Study: Pulmonary Embolism  Ordering Physician: SARI LAW  Performed By: Radha Dang     BSA: 2.3 m2  Height: 74 in  Weight: 220 lb  HR: 92  BP: 105/68 mmHg  ______________________________________________________________________________  Procedure  Complete Portable Echo Adult.  ______________________________________________________________________________  Interpretation Summary     Left ventricular  systolic function is normal.  The visual ejection fraction is 60-65%.  The right ventricle is normal in size and function.  Right atrial size is normal.  There is trace tricuspid valve regurgitation.  Right ventricle systolic pressure estimate normal  The inferior vena cava is normal.     There is no comparison study available.  ______________________________________________________________________________  Left Ventricle  The left ventricle is normal in size. There is normal left ventricular wall  thickness. Left ventricular systolic function is normal. The visual ejection  fraction is 60-65%. Left ventricular diastolic function is normal. No regional  wall motion abnormalities noted.     Right Ventricle  The right ventricle is normal in size and function.     Atria  Normal left atrial size. Right atrial size is normal. There is no color  Doppler evidence of an atrial shunt.     Mitral Valve  The mitral valve leaflets appear normal. There is no evidence of stenosis,  fluttering, or prolapse. There is trace mitral regurgitation.     Tricuspid Valve  Normal tricuspid valve. There is trace tricuspid regurgitation. The right  ventricular systolic pressure is approximated at 22.8 mmHg plus the right  atrial pressure. Right ventricle systolic pressure estimate normal.     Aortic Valve  The aortic valve is trileaflet. No aortic regurgitation is present. No aortic  stenosis is present.     Pulmonic Valve  The pulmonic valve is not well visualized. There is trace pulmonic valvular  regurgitation.     Vessels  Mild aortic root dilatation. 4.0 cm. The ascending aorta is Mildly dilated.  4.1 cm. The inferior vena cava is normal.     Pericardium  There is no pericardial effusion.     Rhythm  Sinus rhythm was noted.  ______________________________________________________________________________  MMode/2D Measurements & Calculations     IVSd: 1.1 cm  LVIDd: 4.9 cm  LVIDs: 3.3 cm  LVPWd: 1.2 cm  FS: 32.6 %  LV mass(C)d: 201.0  grams  LV mass(C)dI: 88.8 grams/m2  Ao root diam: 4.1 cm  LA dimension: 3.9 cm  asc Aorta Diam: 4.3 cm  LA/Ao: 0.95  LVOT diam: 2.2 cm  LVOT area: 3.8 cm2  RWT: 0.47     Doppler Measurements & Calculations  MV E max geri: 59.0 cm/sec  MV A max geri: 46.3 cm/sec  MV E/A: 1.3  MV max P.9 mmHg  MV mean P.0 mmHg  MV V2 VTI: 30.5 cm  MV dec time: 0.17 sec  PA acc time: 0.12 sec  TR max geri: 238.9 cm/sec  TR max P.8 mmHg  E/E' av.0     Lateral E/e': 4.2  Medial E/e': 5.8     ______________________________________________________________________________  Report approved by: Dr Sandie Hunt 2021 11:46 AM               Discharge Medications   Current Discharge Medication List      START taking these medications    Details   rivaroxaban ANTICOAGULANT (XARELTO) 20 MG TABS tablet 15 mg twice daily with food for 3 weeks, then 20 mg once daily with food (NOTE: will need 42 15 mg tablets the first time this is filled)  Qty: 60 tablet, Refills: 1    Comments: Future refills by primary care physician  Associated Diagnoses: Other acute pulmonary embolism with acute cor pulmonale (H); Acute deep vein thrombosis (DVT) of proximal vein of left lower extremity (H)         STOP taking these medications       estradiol valerate (DELESTROGEN) 20 MG/ML injection Comments:   Reason for Stopping:             Allergies   No Known Allergies

## 2021-11-05 NOTE — DISCHARGE INSTRUCTIONS
Rivaroxaban Oral tablet  What is this medicine?  RIVAROXABAN (ri va JACQUELIN a ban) is an anticoagulant (blood thinner). It is used to treat blood clots in the lungs or in the veins. It is also used after knee or hip surgeries to prevent blood clots. It is also used to lower the chance of stroke in people with a medical condition called atrial fibrillation.  This medicine may be used for other purposes; ask your health care provider or pharmacist if you have questions.  What should I tell my health care provider before I take this medicine?  They need to know if you have any of these conditions:    bleeding disorders    bleeding in the brain    blood in your stools (black or tarry stools) or if you have blood in your vomit    history of stomach bleeding    kidney disease    liver disease    low blood counts, like low white cell, platelet, or red cell counts    recent or planned spinal or epidural procedure    take medicines that treat or prevent blood clots    an unusual or allergic reaction to rivaroxaban, other medicines, foods, dyes, or preservatives    pregnant or trying to get pregnant    breast-feeding  How should I use this medicine?  Take this medicine by mouth with a glass of water. Follow the directions on the prescription label. Take your medicine at regular intervals. Do not take it more often than directed. Do not stop taking except on your doctor's advice. Stopping this medicine may increase your risk of a blot clot. Be sure to refill your prescription before you run out of medicine.  If you are taking this medicine after hip or knee replacement surgery, take it with or without food. If you are taking this medicine for atrial fibrillation, take it with your evening meal. If you are taking this medicine to treat blood clots, take it with food at the same time each day. If you are unable to swallow your tablet, you may crush the tablet and mix it in applesauce. Then, immediately eat the applesauce. You  should eat more food right after you eat the applesauce containing the crushed tablet.  Talk to your pediatrician regarding the use of this medicine in children. Special care may be needed.  Overdosage: If you think you have taken too much of this medicine contact a poison control center or emergency room at once.  NOTE: This medicine is only for you. Do not share this medicine with others.  What if I miss a dose?  If you take your medicine once a day and miss a dose, take the missed dose as soon as you remember. If you take your medicine twice a day and miss a dose, take the missed dose immediately. In this instance, 2 tablets may be taken at the same time. The next day you should take 1 tablet twice a day as directed.  What may interact with this medicine?    aspirin and aspirin-like medicines    certain antibiotics like erythromycin, azithromycin, and clarithromycin    certain medicines for fungal infections like ketoconazole and itraconazole    certain medicines for irregular heart beat like amiodarone, quinidine, dronedarone    certain medicines for seizures like carbamazepine, phenytoin    certain medicines that treat or prevent blood clots like warfarin, enoxaparin, and dalteparin    conivaptan    diltiazem    felodipine    indinavir    lopinavir; ritonavir    NSAIDS, medicines for pain and inflammation, like ibuprofen or naproxen    ranolazine    rifampin    ritonavir    Krystyna's wort    verapamil  This list may not describe all possible interactions. Give your health care provider a list of all the medicines, herbs, non-prescription drugs, or dietary supplements you use. Also tell them if you smoke, drink alcohol, or use illegal drugs. Some items may interact with your medicine.  What should I watch for while using this medicine?  Visit your doctor or health care professional for regular checks on your progress. Your condition will be monitored carefully while you are receiving this medicine.  If you are  going to have surgery, tell your doctor or health care professional that you are taking this medicine.  Avoid sports and activities that might cause injury while you are using this medicine. Severe falls or injuries can cause unseen bleeding. Be careful when using sharp tools or knives. Consider using an electric razor. Take special care brushing or flossing your teeth. Report any injuries, bruising, or red spots on the skin to your doctor or health care professional.  What side effects may I notice from receiving this medicine?  Side effects that you should report to your doctor or health care professional as soon as possible:    allergic reactions like skin rash, itching or hives, swelling of the face, lips, or tongue    back pain    confusion, trouble speaking or understanding    redness, blistering, peeling or loosening of the skin, including inside the mouth    signs and symptoms of bleeding such as bloody or black, tarry stools; red or dark-brown urine; spitting up blood or brown material that looks like coffee grounds; red spots on the skin; unusual bruising or bleeding from the eye, gums, or nose    signs and symptoms of a blood clot such as breathing problems; changes in vision; chest pain; severe, sudden headache; pain, swelling, warmth in the leg; trouble speaking; sudden numbness or weakness of the face, arm, or leg    trouble walking, dizziness, loss of balance or coordination  Side effects that usually do not require medical attention (Report these to your doctor or health care professional if they continue or are bothersome.):    dizziness    muscle pain  This list may not describe all possible side effects. Call your doctor for medical advice about side effects. You may report side effects to FDA at 4-601-FDA-5958.  Where should I keep my medicine?  Keep out of the reach of children.  Store at room temperature between 15 and 30 degrees C (59 and 86 degrees F). Throw away any unused medicine after the  expiration date.  NOTE: This sheet is a summary. It may not cover all possible information. If you have questions about this medicine, talk to your doctor, pharmacist, or health care provider.  NOTE:This sheet is a summary. It may not cover all possible information. If you have questions about this medicine, talk to your doctor, pharmacist, or health care provider. Copyright  2014 Gold Standard      o  Xarelto 30 Day Starter Pack   Uses  This medicine is used for the following purposes:    prevent blood clots    blood clot  Instructions  Take the medicine with food.  Store at room temperature in a dry place. Do not keep in the bathroom.  Keep the medicine away from heat and light.  Avoid grapefruit juice while on this medicine.  It is important that you keep taking each dose of this medicine on time even if you are feeling well.  If you forget to take a dose on time, take it as soon as you remember. If it is almost time for the next dose, do not take the missed dose. Return to your normal dosing schedule. Do not take 2 doses of this medicine at one time.  Please tell your doctor and pharmacist about all the medicines you take. Include both prescription and over-the-counter medicines. Also tell them about any vitamins, herbal medicines, or anything else you take for your health.  Do not suddenly stop taking this medicine. Check with your doctor before stopping.  It is very important that you follow your doctor's instructions for all blood tests.  Cautions  Tell your doctor and pharmacist if you ever had an allergic reaction to a medicine. Symptoms of an allergic reaction can include trouble breathing, skin rash, itching, swelling, or severe dizziness.  Some patients taking this medicine have experienced serious side effects. Please speak with your doctor to understand the risks and benefits associated with this medicine.  This medicine may cause serious bleeding from the stomach or bowels. Stop this medicine and call  your doctor immediately if you see any signs of bleeding. Bleeding can cause pain in the stomach, vomiting up liquid that looks like coffee grounds, and red or dark tarry stools.  There is an increased risk of bleeding while on this medicine, please tell your doctor or nurse if you notice any excessive bleeding or bruising.  Do not use the medication any more than instructed.  Speak with your doctor before taking any medicine with aspirin.  Tell the doctor or pharmacist if you are pregnant, planning to be pregnant, or breastfeeding.  Do not take Krystyna's wort while on this medicine.  Ask your pharmacist if this medicine can interact with any of your other medicines. Be sure to tell them about all the medicines you take.  Please tell all your doctors and dentists that you are on this medicine before they provide care.  Do not start or stop any other medicines without first speaking to your doctor or pharmacist.  This medicine can cause serious side effects in some patients. Important information from the U.S. Food and Drug Administration (FDA) is available from your pharmacist. Please review it carefully with your pharmacist to understand the risks associated with this medicine.  Always refill this medicine before it runs out.  Side Effects  The following is a list of some common side effects from this medicine. Please speak with your doctor about what you should do if you experience these or other side effects.    back pain    increased risk of bleeding    itching    muscle pain  Call your doctor or get medical help right away if you notice any of these more serious side effects:    bleeding that is severe or takes longer to stop    unusual bruising or discoloration on skin    confusion    fainting    loss of movement anywhere on the body    feeling of numbness or tingling in your hands and feet    slurred speech    dark, tarry stool    unusual or unexplained tiredness or weakness    weakness on one side of the  body  Extra  Please speak with your doctor, nurse, or pharmacist if you have any questions about this medicine.  [email or web address]  IMPORTANT NOTE: This document tells you briefly how to take your medicine, but it does not tell you all there is to know about it.Your doctor or pharmacist may give you other documents about your medicine. Please talk to them if you have any questions.Always follow their advice. There is a more complete description of this medicine available in English.Scan this code on your smartphone or tablet or use the web address below. You can also ask your pharmacist for a printout. If you have any questions, please ask your pharmacist.     2021 [employer].

## 2021-11-05 NOTE — PLAN OF CARE
VS-stable, denies episodes of anxiety or panic attacks. Heparin stopped at 1100 am today.   Lung Sounds-clear, no cough, no prod phlegm. Taking deep breathes with ease. Denies SOB or GERARDO.   O2-on room air.   GI-+bs, +flatus, lbm was this morning, tolerating reg diet.   -voiding well.   IVF-sl iv at 1100 heparin stopped.   Dressings-none.   CMS-denies numbness and tingling, +pp, no swelling,   Drain-none.   Activity-up independently. Up in room.   Pain-denies pain , no pain  with stretch of legs or bearing weight on legs.   D/C Plan-home today. Will have heart monitor to go home with . Had one dose of xarelto before leaving, will need 2 nd dose tonight;

## 2021-11-05 NOTE — PLAN OF CARE
RN [id number]  A&Ox4, ind, hep gtt 1800 units/hr, Xa recheck this AM. Discussed side effects of heparin and symptoms of PE's w/ pt. One further episode of dizziness/flushing but currently resolved. Tele SB w/ a 2.0 s pause. VSS, will continue to monitor.

## 2021-11-06 DIAGNOSIS — Z71.89 OTHER SPECIFIED COUNSELING: ICD-10-CM

## 2021-11-07 ENCOUNTER — PATIENT OUTREACH (OUTPATIENT)
Dept: CARE COORDINATION | Facility: CLINIC | Age: 56
End: 2021-11-07
Payer: COMMERCIAL

## 2021-11-07 NOTE — PROGRESS NOTES
Clinic Care Coordination Contact  [facility]/Voicemail       Clinical Data: Care Coordinator Outreach  Outreach attempted x 1.  Voicemail full.  Plan:Care Coordinator will try to reach patient again in 1-2 business days.    Joanna Barrow  Community Health Worker  [facility] Care Resource [facility]  [phone]

## 2021-11-08 NOTE — PROGRESS NOTES
Clinic Care Coordination Contact  [facility]/Voicemail       Clinical Data: Care Coordinator Outreach  Outreach attempted x 2. Unable tp leave a  message on patient's voicemail with call back information.  Plan: Care Coordinator will do no further outreaches at this time.    Sylvia Kyle  Community Health Worker  [facility]  Ph:[phone]

## 2023-06-03 ENCOUNTER — APPOINTMENT (OUTPATIENT)
Dept: GENERAL RADIOLOGY | Facility: CLINIC | Age: 58
End: 2023-06-03
Attending: EMERGENCY MEDICINE
Payer: COMMERCIAL

## 2023-06-03 ENCOUNTER — HOSPITAL ENCOUNTER (EMERGENCY)
Facility: CLINIC | Age: 58
Discharge: HOME | End: 2023-06-03
Attending: EMERGENCY MEDICINE | Admitting: EMERGENCY MEDICINE
Payer: COMMERCIAL

## 2023-06-03 VITALS
DIASTOLIC BLOOD PRESSURE: 86 MMHG | HEART RATE: 73 BPM | RESPIRATION RATE: 16 BRPM | SYSTOLIC BLOOD PRESSURE: 139 MMHG | TEMPERATURE: 98.1 F | OXYGEN SATURATION: 100 %

## 2023-06-03 DIAGNOSIS — R06.02 SHORTNESS OF BREATH: ICD-10-CM

## 2023-06-03 DIAGNOSIS — R53.81 MALAISE: ICD-10-CM

## 2023-06-03 LAB
ANION GAP SERPL CALCULATED.3IONS-SCNC: 11 MMOL/L (ref 7–15)
BASOPHILS # BLD AUTO: 0 10E3/UL (ref 0–0.2)
BASOPHILS NFR BLD AUTO: 0 %
BUN SERPL-MCNC: 14.1 MG/DL (ref 6–20)
CALCIUM SERPL-MCNC: 9 MG/DL (ref 8.6–10)
CHLORIDE SERPL-SCNC: 102 MMOL/L (ref 98–107)
CREAT SERPL-MCNC: 0.88 MG/DL (ref 0.51–0.95)
D DIMER PPP FEU-MCNC: 0.27 UG/ML FEU (ref 0–0.5)
DEPRECATED HCO3 PLAS-SCNC: 23 MMOL/L (ref 22–29)
EOSINOPHIL # BLD AUTO: 0.1 10E3/UL (ref 0–0.7)
EOSINOPHIL NFR BLD AUTO: 2 %
ERYTHROCYTE [DISTWIDTH] IN BLOOD BY AUTOMATED COUNT: 12.8 % (ref 10–15)
FLUAV RNA SPEC QL NAA+PROBE: NEGATIVE
FLUBV RNA RESP QL NAA+PROBE: NEGATIVE
GFR SERPL CREATININE-BSD FRML MDRD: 77 ML/MIN/1.73M2
GLUCOSE SERPL-MCNC: 111 MG/DL (ref 70–99)
HCT VFR BLD AUTO: 35.8 % (ref 35–47)
HGB BLD-MCNC: 12.3 G/DL (ref 11.7–15.7)
HOLD SPECIMEN: NORMAL
HOLD SPECIMEN: NORMAL
IMM GRANULOCYTES # BLD: 0 10E3/UL
IMM GRANULOCYTES NFR BLD: 0 %
LYMPHOCYTES # BLD AUTO: 1.1 10E3/UL (ref 0.8–5.3)
LYMPHOCYTES NFR BLD AUTO: 19 %
MCH RBC QN AUTO: 31.4 PG (ref 26.5–33)
MCHC RBC AUTO-ENTMCNC: 34.4 G/DL (ref 31.5–36.5)
MCV RBC AUTO: 91 FL (ref 78–100)
MONOCYTES # BLD AUTO: 0.5 10E3/UL (ref 0–1.3)
MONOCYTES NFR BLD AUTO: 8 %
NEUTROPHILS # BLD AUTO: 4.3 10E3/UL (ref 1.6–8.3)
NEUTROPHILS NFR BLD AUTO: 71 %
NRBC # BLD AUTO: 0 10E3/UL
NRBC BLD AUTO-RTO: 0 /100
NT-PROBNP SERPL-MCNC: 212 PG/ML (ref 0–900)
PLATELET # BLD AUTO: 142 10E3/UL (ref 150–450)
POTASSIUM SERPL-SCNC: 4 MMOL/L (ref 3.4–5.3)
RBC # BLD AUTO: 3.92 10E6/UL (ref 3.8–5.2)
RSV RNA SPEC NAA+PROBE: NEGATIVE
SARS-COV-2 RNA RESP QL NAA+PROBE: NEGATIVE
SODIUM SERPL-SCNC: 136 MMOL/L (ref 136–145)
TROPONIN T SERPL HS-MCNC: <6 NG/L
WBC # BLD AUTO: 6.1 10E3/UL (ref 4–11)

## 2023-06-03 PROCEDURE — 99285 EMERGENCY DEPT VISIT HI MDM: CPT | Mod: 25

## 2023-06-03 PROCEDURE — 36415 COLL VENOUS BLD VENIPUNCTURE: CPT | Performed by: EMERGENCY MEDICINE

## 2023-06-03 PROCEDURE — 80048 BASIC METABOLIC PNL TOTAL CA: CPT | Performed by: EMERGENCY MEDICINE

## 2023-06-03 PROCEDURE — 93005 ELECTROCARDIOGRAM TRACING: CPT

## 2023-06-03 PROCEDURE — 87637 SARSCOV2&INF A&B&RSV AMP PRB: CPT | Performed by: EMERGENCY MEDICINE

## 2023-06-03 PROCEDURE — 85025 COMPLETE CBC W/AUTO DIFF WBC: CPT | Performed by: EMERGENCY MEDICINE

## 2023-06-03 PROCEDURE — 85379 FIBRIN DEGRADATION QUANT: CPT | Performed by: EMERGENCY MEDICINE

## 2023-06-03 PROCEDURE — 71046 X-RAY EXAM CHEST 2 VIEWS: CPT

## 2023-06-03 PROCEDURE — 84484 ASSAY OF TROPONIN QUANT: CPT | Performed by: EMERGENCY MEDICINE

## 2023-06-03 PROCEDURE — 83880 ASSAY OF NATRIURETIC PEPTIDE: CPT | Performed by: EMERGENCY MEDICINE

## 2023-06-03 ASSESSMENT — ACTIVITIES OF DAILY LIVING (ADL)
ADLS_ACUITY_SCORE: 35
ADLS_ACUITY_SCORE: 35

## 2023-06-03 NOTE — ED PROVIDER NOTES
History     Chief Complaint:  Chest Pain and Shortness of Breath       The history is provided by the patient.      Jerri Peraza is a 58 year old transgender male to female on Xarelto with a history of pulmonary embolism who presents with chest pain and shortness of breath. The patient reports that she has been experiencing intermittent calf cramping, shortness of breath, chest heaviness, increase in pulse, and lightheadedness that began yesterday and went away but began again when golfing today. She adds that her symptoms yesterday were worse than what she has been experiencing today. She states that her pulse has been going up even when she is sitting. She adds that she is experiencing an increase in shortness of breath when walking and going up steps. She states that a couple months ago switched from estrogen patches to injections. She reports that she has been drinking fluids okay. She states that she has been experiencing rhinorrhea and congestion due to allergies. She denies leg swelling, missed dose of Xarelto, hx asthma, or hx COPD.    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     Independent Historian:   None - Patient Only    Review of External Notes:   None        Medications:    Xalertone  Progesterone - patient reported  Hormone replacement therapy (HRT) - the patient reported    Past Medical History:    ADHD  DVT (deep vein thrombosis)  Pulmonary embolism  Varicella  Varicosities    Past Surgical History:    [place] teeth surgery   Varicose vein surgery     Physical Exam     Patient Vitals for the past 24 hrs:   BP Temp Temp src Pulse Resp SpO2   06/03/23 1529 139/86 98.1  F (36.7  C) Temporal 73 16 100 %        Physical Exam  Constitutional: Alert, attentive, GCS 15   HENT:    Nose: Nose normal.    Mouth/Throat: Oropharynx is clear, mucous membranes are  moist  Eyes: EOM are normal, anicteric, conjugate gaze  CV: regular rate and rhythm; no murmurs  Chest: Effort normal and breath sounds  clear without wheezing or rales, symmetric bilaterally   GI:  non tender. No distension. No guarding or rebound.    MSK: No LE edema, no tenderness to palpation of BLE.  Neurological: Alert, attentive, moving all extremities equally.   Skin: Skin is warm and dry.          Emergency Department Course   ECG  ECG results from 06/03/23   EKG 12 lead     Value    Systolic Blood Pressure     Diastolic Blood Pressure     Ventricular Rate 66    Atrial Rate 66    OH Interval 176    QRS Duration 84        QTc 450    P Axis 49    R AXIS -1    T Axis 28    Interpretation ECG      Sinus rhythm  Low voltage QRS  Borderline ECG  When compared with ECG of 03-NOV-2021 18:55,  No significant change was found       Imaging:  Chest XR,  PA & LAT   Final Result   IMPRESSION: Heart size and pulmonary vascularity within normal limits. Deep inspiration both lungs. No focal pulmonary infiltrates. Hypertrophic changes thoracic spine.         Report per radiology    Laboratory:  Labs Ordered and Resulted from Time of ED Arrival to Time of ED Departure   BASIC METABOLIC PANEL - Abnormal       Result Value    Sodium 136      Potassium 4.0      Chloride 102      Carbon Dioxide (CO2) 23      Anion Gap 11      Urea Nitrogen 14.1      Creatinine 0.88      Calcium 9.0      Glucose 111 (*)     GFR Estimate 77     CBC WITH PLATELETS AND DIFFERENTIAL - Abnormal    WBC Count 6.1      RBC Count 3.92      Hemoglobin 12.3      Hematocrit 35.8      MCV 91      MCH 31.4      MCHC 34.4      RDW 12.8      Platelet Count 142 (*)     % Neutrophils 71      % Lymphocytes 19      % Monocytes 8      % Eosinophils 2      % Basophils 0      % Immature Granulocytes 0      NRBCs per 100 WBC 0      Absolute Neutrophils 4.3      Absolute Lymphocytes 1.1      Absolute Monocytes 0.5      Absolute Eosinophils 0.1      Absolute Basophils 0.0      Absolute Immature Granulocytes 0.0      Absolute NRBCs 0.0     TROPONIN T, HIGH SENSITIVITY - Normal    Troponin T, High  Sensitivity <6     NT PROBNP INPATIENT - Normal    N terminal Pro BNP Inpatient 212     D DIMER QUANTITATIVE - Normal    D-Dimer Quantitative 0.27     INFLUENZA A/B, RSV, & SARS-COV2 PCR          Emergency Department Course & Assessments:      Independent Interpretation (X-rays, CTs, rhythm strip):  I reviewed the chest X-Ray.     Assessments/Consultations/Discussion of Management or Tests:  ED Course as of 23 2152   Sat 2023   1637 I obtained history and examined the patient as noted above.    1709 I rechecked the patient and explained findings.        Social Determinants of Health affecting care:   None    Disposition:  The patient was discharged to home.     Impression & Plan      Medical Decision Makin-year-old male to female transgender woman, on injectable hormone replacement therapy and Xarelto with a history of questionably provoked PE secondary to COVID-vaccine and hormone replacement presenting for general malaise, shortness of breath and palpitations.  She had attributed this to heat exposure as she works in [employer] and had been outside and golf today and high humidity, high heat however also feels similar to prior PEs.  She has been compliant with her Xarelto, screening EKG and troponin are negative with low suspicion for ACS.  D-dimer was obtained due to her return to IV hormone replacement therapy despite Xarelto compliance, and fortunately this is negative effectively ruling out PE especially given she has normal vital signs, normal sats and no clinical signs of DVT.  Chest x-ray is clear.  She has no fever, infiltrates however certainly malaise, shortness of breath and cough could be consistent with COVID or influenza, these results are pending.  Given her negative screening work-up here, I do feel she is safe for discharge home, I recommended over-the-counter allergy medication, continue compliance with her medication and following up in clinic.  Return precautions reviewed  and she was discharged.    Diagnosis:    ICD-10-CM    1. Shortness of breath  R06.02       2. Malaise  R53.81            Jose Stockton MD  [employer]  6:42 PM 06/03/23     Scribe Disclosure:  I, Lina Park, am serving as a scribe at 5:27 PM on 6/3/2023 to document services personally performed by Jose Stockton MD based on my observations and the provider's statements to me.      6/3/2023   Jose Stockton MD Dunbar, John Forrest, MD  06/03/23 [id number]       Jose Stockton MD  06/03/23 0691

## 2023-06-03 NOTE — ED TRIAGE NOTES
chest heaviness, shortness of breath and lightheadedness that started yesterday. Initially felt that it was related to working outside in the yard in the heat. Felt symptoms again today when she was golfing, thought it was related to heat exposure. Symptoms continue to persist so she came to ER for eval. Hx of PE, last one was 1.5 years ago. Takes xarelto. In triage, respirations are even and unlabored. sats 99%.

## 2023-06-03 NOTE — DISCHARGE INSTRUCTIONS
I recommend picking up an over-the-counter allergy medication, avoiding the high heat, drinking plenty of fluids to stay well-hydrated and following up with your primary doctor for a recheck.  She develop worsening chest pain, chest pressure or shortness of breath or passout, you should return here to the emergency department.

## 2023-06-05 LAB
ATRIAL RATE - MUSE: 66 BPM
DIASTOLIC BLOOD PRESSURE - MUSE: NORMAL MMHG
INTERPRETATION ECG - MUSE: NORMAL
P AXIS - MUSE: 49 DEGREES
PR INTERVAL - MUSE: 176 MS
QRS DURATION - MUSE: 84 MS
QT - MUSE: 430 MS
QTC - MUSE: 450 MS
R AXIS - MUSE: -1 DEGREES
SYSTOLIC BLOOD PRESSURE - MUSE: NORMAL MMHG
T AXIS - MUSE: 28 DEGREES
VENTRICULAR RATE- MUSE: 66 BPM